# Patient Record
Sex: OTHER/UNKNOWN | Race: WHITE | NOT HISPANIC OR LATINO | URBAN - METROPOLITAN AREA
[De-identification: names, ages, dates, MRNs, and addresses within clinical notes are randomized per-mention and may not be internally consistent; named-entity substitution may affect disease eponyms.]

---

## 2018-11-28 ENCOUNTER — APPOINTMENT (OUTPATIENT)
Age: 43
Setting detail: DERMATOLOGY
End: 2018-11-28

## 2018-11-28 VITALS
HEIGHT: 61 IN | WEIGHT: 170 LBS | HEART RATE: 68 BPM | DIASTOLIC BLOOD PRESSURE: 78 MMHG | SYSTOLIC BLOOD PRESSURE: 119 MMHG

## 2018-11-28 DIAGNOSIS — H02.72 MADAROSIS OF EYELID AND PERIOCULAR AREA: ICD-10-CM

## 2018-11-28 PROBLEM — L70.0 ACNE VULGARIS: Status: ACTIVE | Noted: 2018-11-28

## 2018-11-28 PROBLEM — H02.729 MADAROSIS OF UNSPECIFIED EYE, UNSPECIFIED EYELID AND PERIOCULAR AREA: Status: ACTIVE | Noted: 2018-11-28

## 2018-11-28 PROCEDURE — OTHER COUNSELING: OTHER

## 2018-11-28 PROCEDURE — 99202 OFFICE O/P NEW SF 15 MIN: CPT

## 2018-11-28 PROCEDURE — OTHER TREATMENT REGIMEN: OTHER

## 2018-11-28 PROCEDURE — OTHER MIPS QUALITY: OTHER

## 2018-11-28 NOTE — HPI: HAIR LOSS
Previous Labs: No
How Did The Hair Loss Occur?: sudden in onset
How Severe Is Your Hair Loss?: severe

## 2018-11-28 NOTE — PROCEDURE: TREATMENT REGIMEN
Continue Regimen: Gentle cleansing
Discontinue Regimen: Eyelash curling\\nEye makeup including mascara for next 2 weeks
Detail Level: Zone

## 2018-11-28 NOTE — PROCEDURE: MIPS QUALITY
Quality 226: Preventive Care And Screening: Tobacco Use: Screening And Cessation Intervention: Patient screened for tobacco use and is an ex/non-smoker
Quality 110: Preventive Care And Screening: Influenza Immunization: Influenza Immunization not Administered because Patient Refused.
Detail Level: Detailed
Quality 130: Documentation Of Current Medications In The Medical Record: Current Medications Documented